# Patient Record
Sex: FEMALE | Race: WHITE | URBAN - METROPOLITAN AREA
[De-identification: names, ages, dates, MRNs, and addresses within clinical notes are randomized per-mention and may not be internally consistent; named-entity substitution may affect disease eponyms.]

---

## 2020-02-20 NOTE — PROGRESS NOTES
HPI:    Patient ID: Eugene Clement is a 42year old female. Anxiety   This is a chronic problem. The current episode started more than 1 month ago. The problem occurs intermittently. The problem has been waxing and waning.  Pertinent negatives include no She is exercising regularly. Recently increased Lexapro to 20 mg daily due to increased anxiety this is been effective. Uses alprazolam 2-3 times per month. Plan to continue current medications. Follow-up here for routine physical and Pap.     Skin lesi

## 2020-03-06 RX ORDER — OSELTAMIVIR PHOSPHATE 75 MG/1
75 CAPSULE ORAL 2 TIMES DAILY
Qty: 10 CAPSULE | Refills: 0 | Status: SHIPPED | OUTPATIENT
Start: 2020-03-06 | End: 2020-03-21

## 2020-03-21 RX ORDER — ALPRAZOLAM 0.25 MG/1
TABLET ORAL
Qty: 60 TABLET | Refills: 1 | Status: SHIPPED | OUTPATIENT
Start: 2020-03-21

## 2020-03-28 RX ORDER — AMOXICILLIN AND CLAVULANATE POTASSIUM 875; 125 MG/1; MG/1
1 TABLET, FILM COATED ORAL 2 TIMES DAILY
Qty: 20 TABLET | Refills: 0 | Status: SHIPPED | OUTPATIENT
Start: 2020-03-28 | End: 2020-04-07

## 2020-07-09 ENCOUNTER — TELEPHONE (OUTPATIENT)
Dept: FAMILY MEDICINE CLINIC | Facility: CLINIC | Age: 40
End: 2020-07-09

## 2020-07-09 RX ORDER — ESTRADIOL 1 MG/1
1 TABLET ORAL DAILY
Qty: 21 TABLET | Refills: 0 | Status: SHIPPED | OUTPATIENT
Start: 2020-07-09 | End: 2020-07-30

## 2020-07-09 NOTE — TELEPHONE ENCOUNTER
Patient has had menstrual.  Heavy at times for the past 2 weeks. She has a Nexplanon but generally does have regular menses. Has been sexually active, checked pregnancy test which was negative, no unusual vaginal discharge or lesions.   Suspect endometria

## 2020-07-21 ENCOUNTER — OFFICE VISIT (OUTPATIENT)
Dept: FAMILY MEDICINE CLINIC | Facility: CLINIC | Age: 40
End: 2020-07-21
Payer: COMMERCIAL

## 2020-07-21 VITALS
DIASTOLIC BLOOD PRESSURE: 64 MMHG | WEIGHT: 152.81 LBS | BODY MASS INDEX: 25.15 KG/M2 | TEMPERATURE: 98 F | HEIGHT: 65.5 IN | RESPIRATION RATE: 18 BRPM | HEART RATE: 76 BPM | SYSTOLIC BLOOD PRESSURE: 102 MMHG

## 2020-07-21 DIAGNOSIS — L98.9 SKIN LESION OF BACK: ICD-10-CM

## 2020-07-21 DIAGNOSIS — N93.8 DUB (DYSFUNCTIONAL UTERINE BLEEDING): ICD-10-CM

## 2020-07-21 DIAGNOSIS — Z01.419 ENCOUNTER FOR GYNECOLOGICAL EXAMINATION WITHOUT ABNORMAL FINDING: Primary | ICD-10-CM

## 2020-07-21 DIAGNOSIS — L30.9 DERMATITIS: ICD-10-CM

## 2020-07-21 DIAGNOSIS — F32.A ANXIETY AND DEPRESSION: ICD-10-CM

## 2020-07-21 DIAGNOSIS — F41.9 ANXIETY AND DEPRESSION: ICD-10-CM

## 2020-07-21 PROBLEM — Z78.9 USES CONTRACEPTIVE IMPLANT FOR BIRTH CONTROL: Status: ACTIVE | Noted: 2020-07-21

## 2020-07-21 PROCEDURE — 90471 IMMUNIZATION ADMIN: CPT | Performed by: FAMILY MEDICINE

## 2020-07-21 PROCEDURE — 3074F SYST BP LT 130 MM HG: CPT | Performed by: FAMILY MEDICINE

## 2020-07-21 PROCEDURE — 90651 9VHPV VACCINE 2/3 DOSE IM: CPT | Performed by: FAMILY MEDICINE

## 2020-07-21 PROCEDURE — 36415 COLL VENOUS BLD VENIPUNCTURE: CPT | Performed by: FAMILY MEDICINE

## 2020-07-21 PROCEDURE — 3008F BODY MASS INDEX DOCD: CPT | Performed by: FAMILY MEDICINE

## 2020-07-21 PROCEDURE — 99395 PREV VISIT EST AGE 18-39: CPT | Performed by: FAMILY MEDICINE

## 2020-07-21 PROCEDURE — 3078F DIAST BP <80 MM HG: CPT | Performed by: FAMILY MEDICINE

## 2020-07-21 NOTE — PROGRESS NOTES
HPI:    Patient ID: Joselito Roth is a 42year old female. HPI    Review of Systems   Constitutional: Negative. Respiratory: Negative. Cardiovascular: Negative. Gastrointestinal: Negative. Genitourinary: Positive for menstrual problem.    Sk of back–left upper back chronic irritation. No suspicious pigmentation. Patient will return for excision. Dermatitis–erythematous papular abdominal rash mildly pruritic occurred 10 days ago after time outdoors.   Suspect chiggers, appears to be [employer]

## 2020-07-22 LAB
ABSOLUTE BASOPHILS: 31 CELLS/UL (ref 0–200)
ABSOLUTE EOSINOPHILS: 120 CELLS/UL (ref 15–500)
ABSOLUTE LYMPHOCYTES: 1976 CELLS/UL (ref 850–3900)
ABSOLUTE MONOCYTES: 478 CELLS/UL (ref 200–950)
ABSOLUTE NEUTROPHILS: 2595 CELLS/UL (ref 1500–7800)
BASOPHILS: 0.6 %
BUN: 13 MG/DL (ref 7–25)
CALCIUM: 9.4 MG/DL (ref 8.6–10.2)
CARBON DIOXIDE: 27 MMOL/L (ref 20–32)
CHLORIDE: 105 MMOL/L (ref 98–110)
CHOL/HDLC RATIO: 3.5 (CALC)
CHOLESTEROL, TOTAL: 219 MG/DL
CREATININE: 0.73 MG/DL (ref 0.5–1.1)
EGFR IF AFRICN AM: 122 ML/MIN/1.73M2
EGFR IF NONAFRICN AM: 105 ML/MIN/1.73M2
EOSINOPHILS: 2.3 %
GLUCOSE: 88 MG/DL (ref 65–99)
HDL CHOLESTEROL: 63 MG/DL
HEMATOCRIT: 39.3 % (ref 35–45)
HEMOGLOBIN: 13.5 G/DL (ref 11.7–15.5)
LDL-CHOLESTEROL: 138 MG/DL (CALC)
LYMPHOCYTES: 38 %
MCH: 30.9 PG (ref 27–33)
MCHC: 34.4 G/DL (ref 32–36)
MCV: 89.9 FL (ref 80–100)
MONOCYTES: 9.2 %
MPV: 10.8 FL (ref 7.5–12.5)
NEUTROPHILS: 49.9 %
NON-HDL CHOLESTEROL: 156 MG/DL (CALC)
PLATELET COUNT: 244 THOUSAND/UL (ref 140–400)
POTASSIUM: 4.5 MMOL/L (ref 3.5–5.3)
RDW: 12.1 % (ref 11–15)
RED BLOOD CELL COUNT: 4.37 MILLION/UL (ref 3.8–5.1)
SODIUM: 139 MMOL/L (ref 135–146)
TRIGLYCERIDES: 81 MG/DL
TSH: 2.07 MIU/L
WHITE BLOOD CELL COUNT: 5.2 THOUSAND/UL (ref 3.8–10.8)

## 2020-07-23 LAB
CHLAMYDIA TRACHOMATIS$RNA, TMA: NOT DETECTED
HPV MRNA E6/E7: NOT DETECTED
NEISSERIA GONORRHOEAE$RNA, TMA: NOT DETECTED

## 2020-07-30 ENCOUNTER — TELEPHONE (OUTPATIENT)
Dept: FAMILY MEDICINE CLINIC | Facility: CLINIC | Age: 40
End: 2020-07-30

## 2020-07-30 RX ORDER — FLUCONAZOLE 150 MG/1
150 TABLET ORAL ONCE
Qty: 1 TABLET | Refills: 0 | Status: SHIPPED | OUTPATIENT
Start: 2020-07-30 | End: 2020-07-30

## 2020-08-20 RX ORDER — ESCITALOPRAM OXALATE 20 MG/1
TABLET ORAL
Qty: 90 TABLET | Refills: 1 | Status: SHIPPED | OUTPATIENT
Start: 2020-08-20 | End: 2020-09-09

## 2020-09-09 NOTE — PROCEDURES
Discussed procedure and possible side effects. Cryo-done today with aseptic technique. lesion treated with liquid nitrogen ×10 seconds, 2 treatments. Tolerated well. Wound care discussed.

## 2020-09-09 NOTE — PROGRESS NOTES
HPI:    Patient ID: J Luis Maldonado is a 42year old female. Anxiety   This is a chronic problem. The current episode started more than 1 month ago. The problem occurs daily. The problem has been gradually worsening.  Associated symptoms include fatigue a estranged 's. Intermittent intrusive thoughts. She has used some marijuana with significant improvement in the symptoms. Discussed pros and cons of medical marijuana certification, certification done today.     Skin lesion of back– left scapular r

## 2020-10-05 RX ORDER — DESVENLAFAXINE 50 MG/1
TABLET, EXTENDED RELEASE ORAL
Qty: 30 TABLET | Refills: 1 | Status: SHIPPED | OUTPATIENT
Start: 2020-10-05 | End: 2020-10-30

## 2020-10-05 NOTE — TELEPHONE ENCOUNTER
Please let patient know refill done but recommend follow-up appointment– virtual or office in 2 to 3 weeks

## 2020-10-30 RX ORDER — DESVENLAFAXINE 50 MG/1
TABLET, EXTENDED RELEASE ORAL
Qty: 30 TABLET | Refills: 1 | Status: SHIPPED | OUTPATIENT
Start: 2020-10-30 | End: 2020-12-15

## 2020-11-03 ENCOUNTER — APPOINTMENT (OUTPATIENT)
Dept: LAB | Age: 40
End: 2020-11-03
Attending: FAMILY MEDICINE
Payer: COMMERCIAL

## 2020-11-03 ENCOUNTER — TELEPHONE (OUTPATIENT)
Dept: FAMILY MEDICINE CLINIC | Facility: CLINIC | Age: 40
End: 2020-11-03

## 2020-11-03 DIAGNOSIS — Z20.822 EXPOSURE TO COVID-19 VIRUS: ICD-10-CM

## 2020-11-03 DIAGNOSIS — R51.9 ACUTE NONINTRACTABLE HEADACHE, UNSPECIFIED HEADACHE TYPE: ICD-10-CM

## 2020-11-03 DIAGNOSIS — R51.9 ACUTE NONINTRACTABLE HEADACHE, UNSPECIFIED HEADACHE TYPE: Primary | ICD-10-CM

## 2020-11-03 NOTE — TELEPHONE ENCOUNTER
Order signed.   Please also document whether patient was within 6 feet for a total of more than 15 minutes (as this determines whether she should quarantine for 14 days in spite of results)

## 2020-11-03 NOTE — TELEPHONE ENCOUNTER
Spoke with pt. She was in contact with positive person within 6 feet. Advised her of 14 day quarantine.  Pt verbalized understanding

## 2020-11-03 NOTE — TELEPHONE ENCOUNTER
Pt exposed to [employer] at work. Has complaints of headache and fatigue.  Requesting Covid test, Order pended

## 2020-12-15 ENCOUNTER — TELEPHONE (OUTPATIENT)
Dept: FAMILY MEDICINE CLINIC | Facility: CLINIC | Age: 40
End: 2020-12-15

## 2020-12-15 RX ORDER — DESVENLAFAXINE 50 MG/1
50 TABLET, EXTENDED RELEASE ORAL DAILY
Qty: 90 TABLET | Refills: 1 | Status: SHIPPED | OUTPATIENT
Start: 2020-12-15 | End: 2020-12-16

## 2020-12-15 NOTE — TELEPHONE ENCOUNTER
Karmen Dennison needs a refill of:             • DESVENLAFAXINE SUCCINATE ER 50 MG Oral Tablet 24 Hr TAKE 1 TABLET BY MOUTH EVERY DAY 30 tablet 1            [facility] is requesting a 90 day supply

## 2020-12-16 RX ORDER — ESCITALOPRAM OXALATE 10 MG/1
20 TABLET ORAL DAILY
Qty: 90 TABLET | Refills: 0 | Status: SHIPPED | OUTPATIENT
Start: 2020-12-16 | End: 2020-12-30

## 2020-12-30 RX ORDER — ESCITALOPRAM OXALATE 10 MG/1
20 TABLET ORAL DAILY
Qty: 90 TABLET | Refills: 0 | Status: SHIPPED | OUTPATIENT
Start: 2020-12-30 | End: 2021-05-10

## 2020-12-30 NOTE — TELEPHONE ENCOUNTER
90 day     Current Outpatient Medications:   •  escitalopram 10 MG Oral Tab, Take 2 tablets (20 mg total) by mouth daily. , Disp: 90 tablet, Rfl: 0

## 2021-03-16 NOTE — PROGRESS NOTES
HPI/Subjective:   Patient ID: Bekah Arango is a 47year old female. Bekah Arango verbally consents to a Virtual/Telephone Check-In service on 03/16/21.     Duration of Service:  7 minutes    Patient has been referred to the [facility] website at [address] [facility]

## 2021-04-19 ENCOUNTER — TELEPHONE (OUTPATIENT)
Dept: INTERNAL MEDICINE CLINIC | Facility: CLINIC | Age: 41
End: 2021-04-19

## 2021-04-19 RX ORDER — ONDANSETRON HYDROCHLORIDE 8 MG/1
8 TABLET, FILM COATED ORAL EVERY 8 HOURS PRN
Qty: 15 TABLET | Refills: 0 | Status: SHIPPED | OUTPATIENT
Start: 2021-04-19

## 2021-05-10 RX ORDER — ESCITALOPRAM OXALATE 10 MG/1
20 TABLET ORAL DAILY
Qty: 90 TABLET | Refills: 0 | Status: SHIPPED | OUTPATIENT
Start: 2021-05-10 | End: 2021-06-17

## 2021-05-15 ENCOUNTER — NURSE ONLY (OUTPATIENT)
Dept: FAMILY MEDICINE CLINIC | Facility: CLINIC | Age: 41
End: 2021-05-15
Payer: COMMERCIAL

## 2021-05-15 DIAGNOSIS — Z23 NEED FOR VACCINATION: Primary | ICD-10-CM

## 2021-05-15 PROCEDURE — 90651 9VHPV VACCINE 2/3 DOSE IM: CPT | Performed by: FAMILY MEDICINE

## 2021-05-15 PROCEDURE — 90471 IMMUNIZATION ADMIN: CPT | Performed by: FAMILY MEDICINE

## 2021-06-17 RX ORDER — ESCITALOPRAM OXALATE 10 MG/1
TABLET ORAL
Qty: 90 TABLET | Refills: 0 | Status: SHIPPED | OUTPATIENT
Start: 2021-06-17 | End: 2021-07-02

## 2021-07-02 RX ORDER — ESCITALOPRAM OXALATE 10 MG/1
20 TABLET ORAL DAILY
Qty: 90 TABLET | Refills: 0 | Status: SHIPPED | OUTPATIENT
Start: 2021-07-02 | End: 2021-08-02

## 2021-07-06 ENCOUNTER — TELEPHONE (OUTPATIENT)
Dept: FAMILY MEDICINE CLINIC | Facility: CLINIC | Age: 41
End: 2021-07-06

## 2021-07-06 NOTE — TELEPHONE ENCOUNTER
Patient was on Escitalopram 10mg 2 tabs daily, see TE 5/10/21 patient indicated is going to start on 10mg 1 tab daily[facility]

## 2021-08-02 NOTE — PROGRESS NOTES
HPI/Subjective:   Patient ID: Sukhdeep Michael is a 47year old female. Patient presents to follow-up on Lexapro as below. Also perianal problems as below.       History/Other:   Review of Systems  Current Outpatient Medications   Medication Sig Dispense defined types were placed in this encounter. Meds This Visit:  Requested Prescriptions     Signed Prescriptions Disp Refills   • escitalopram 10 MG Oral Tab 180 tablet 1     Sig: Take 2 tablets (20 mg total) by mouth daily.        Imaging & Referrals:

## 2021-11-01 ENCOUNTER — IMMUNIZATION (OUTPATIENT)
Dept: FAMILY MEDICINE CLINIC | Facility: CLINIC | Age: 41
End: 2021-11-01
Payer: COMMERCIAL

## 2021-11-01 DIAGNOSIS — Z23 NEED FOR VACCINATION: Primary | ICD-10-CM

## 2021-11-01 PROCEDURE — 90686 IIV4 VACC NO PRSV 0.5 ML IM: CPT | Performed by: FAMILY MEDICINE

## 2021-11-01 PROCEDURE — 90471 IMMUNIZATION ADMIN: CPT | Performed by: FAMILY MEDICINE

## 2022-01-21 ENCOUNTER — LAB ENCOUNTER (OUTPATIENT)
Dept: LAB | Age: 42
End: 2022-01-21
Attending: FAMILY MEDICINE
Payer: COMMERCIAL

## 2022-01-21 DIAGNOSIS — E78.00 HYPERCHOLESTEROLEMIA: Primary | ICD-10-CM

## 2022-01-22 LAB
CHOL/HDLC RATIO: 3.2 (CALC)
CHOLESTEROL, TOTAL: 229 MG/DL
HDL CHOLESTEROL: 71 MG/DL
LDL-CHOLESTEROL: 144 MG/DL (CALC)
NON-HDL CHOLESTEROL: 158 MG/DL (CALC)
TRIGLYCERIDES: 58 MG/DL

## 2022-05-05 RX ORDER — ESCITALOPRAM OXALATE 10 MG/1
TABLET ORAL
Qty: 180 TABLET | Refills: 1 | Status: SHIPPED | OUTPATIENT
Start: 2022-05-05

## 2022-06-17 ENCOUNTER — OFFICE VISIT (OUTPATIENT)
Dept: FAMILY MEDICINE CLINIC | Facility: CLINIC | Age: 42
End: 2022-06-17
Payer: COMMERCIAL

## 2022-06-17 VITALS
OXYGEN SATURATION: 98 % | HEART RATE: 92 BPM | HEIGHT: 65 IN | WEIGHT: 157 LBS | DIASTOLIC BLOOD PRESSURE: 62 MMHG | BODY MASS INDEX: 26.16 KG/M2 | SYSTOLIC BLOOD PRESSURE: 110 MMHG | TEMPERATURE: 98 F

## 2022-06-17 DIAGNOSIS — Z30.46 ENCOUNTER FOR REMOVAL AND REINSERTION OF NEXPLANON: Primary | ICD-10-CM

## 2022-06-17 LAB
CONTROL LINE PRESENT WITH A CLEAR BACKGROUND (YES/NO): YES YES/NO
PREGNANCY TEST, URINE: NEGATIVE

## 2022-11-01 RX ORDER — ESCITALOPRAM OXALATE 10 MG/1
20 TABLET ORAL DAILY
Qty: 180 TABLET | Refills: 1 | Status: SHIPPED | OUTPATIENT
Start: 2022-11-01

## 2022-11-01 NOTE — TELEPHONE ENCOUNTER
[facility]     Requested Prescriptions   Pending Prescriptions Disp Refills    ESCITALOPRAM 10 MG Oral Tab [Pharmacy Med Name: ESCITALOPRAM 10 MG TABLET] 180 tablet 1     Sig: TAKE 2 TABLETS BY MOUTH EVERY DAY       Psychiatric Non-Scheduled (Anti-Anxiety) Passed - 11/1/2022 12:17 AM        Passed - In person appointment or virtual visit in the past 6 mos or appointment in next 3 mos     Recent Outpatient Visits              4 months ago Encounter for removal and reinsertion of [address], [address], Familia Suárez MD    Office Visit    1 year ago Anxiety and depression    [facility], [employer] [facility], Mike Gonzales MD    Office Visit    1 year ago Need for vaccination    [facility], [employer] [facility]    Nurse Only    1 year ago Anxiety and depression    [facility], [employer], Mike Gonzales MD    [employer] E[facility]    2 years ago Anxiety and depression    [facility] EldarionLay Margreta Sloop, MD    Office Visit                           Recent Outpatient Visits              4 months ago Encounter for removal and reinsertion of [address], [address], Familia Suárez MD    Office Visit    1 year ago Anxiety and depression    [facility] Lay Margreta Sloop, MD    Office Visit    1 year ago Need for vaccination    [facility], [employer] [facility]    Nurse Only    1 year ago Anxiety and depression    [facility], [employer] [address] [place] [address] MD Debbie    [employer] E[facility]    2 years ago Anxiety and depression    [facility], [employer] [facility] Kendell Torres MD    Office Visit

## 2022-11-21 ENCOUNTER — NURSE TRIAGE (OUTPATIENT)
Dept: FAMILY MEDICINE CLINIC | Facility: CLINIC | Age: 42
End: 2022-11-21

## 2022-11-21 RX ORDER — NORETHINDRONE ACETATE AND ETHINYL ESTRADIOL 1; .02 MG/1; MG/1
1 TABLET ORAL DAILY
Qty: 84 TABLET | Refills: 0 | Status: SHIPPED | OUTPATIENT
Start: 2022-11-21

## 2022-11-21 NOTE — TELEPHONE ENCOUNTER
Patient called back (name and  verified) and instructed on providers message below. Patient verbalized understanding and agrees to plan.     Future Appointments   Date Time Provider Alexei Mcmullen   2022  4:15 PM [facility]

## 2022-11-21 NOTE — TELEPHONE ENCOUNTER
Patient has probably had progesterone in the past however given the fact that these are likely side effects of the Nexplanon, combined birth control pill will help with irregular bleeding secondary to Nexplanon. Prescription to pharmacy.

## 2022-11-28 ENCOUNTER — OFFICE VISIT (OUTPATIENT)
Dept: FAMILY MEDICINE CLINIC | Facility: CLINIC | Age: 42
End: 2022-11-28
Payer: COMMERCIAL

## 2022-11-28 VITALS
DIASTOLIC BLOOD PRESSURE: 70 MMHG | RESPIRATION RATE: 19 BRPM | HEIGHT: 65 IN | WEIGHT: 164 LBS | HEART RATE: 70 BPM | BODY MASS INDEX: 27.32 KG/M2 | OXYGEN SATURATION: 98 % | SYSTOLIC BLOOD PRESSURE: 121 MMHG

## 2022-11-28 DIAGNOSIS — Z30.46 ENCOUNTER FOR NEXPLANON REMOVAL: Primary | ICD-10-CM

## 2022-11-28 PROCEDURE — 3074F SYST BP LT 130 MM HG: CPT | Performed by: FAMILY MEDICINE

## 2022-11-28 PROCEDURE — 3008F BODY MASS INDEX DOCD: CPT | Performed by: FAMILY MEDICINE

## 2022-11-28 PROCEDURE — 99213 OFFICE O/P EST LOW 20 MIN: CPT | Performed by: FAMILY MEDICINE

## 2022-11-28 PROCEDURE — 3078F DIAST BP <80 MM HG: CPT | Performed by: FAMILY MEDICINE

## 2022-12-06 ENCOUNTER — OFFICE VISIT (OUTPATIENT)
Dept: SURGERY | Facility: CLINIC | Age: 42
End: 2022-12-06
Payer: COMMERCIAL

## 2022-12-06 VITALS — BODY MASS INDEX: 25.71 KG/M2 | HEIGHT: 66 IN | WEIGHT: 160 LBS

## 2022-12-06 DIAGNOSIS — M79.5 FOREIGN BODY (FB) IN SOFT TISSUE: Primary | ICD-10-CM

## 2022-12-06 PROCEDURE — 99203 OFFICE O/P NEW LOW 30 MIN: CPT | Performed by: SURGERY

## 2022-12-06 PROCEDURE — 3008F BODY MASS INDEX DOCD: CPT | Performed by: SURGERY

## 2022-12-06 NOTE — PATIENT INSTRUCTIONS
Obtain preoperative labs. Same-day surgery will call the day before with instructions. Avoid aspirin and NSAIDs for 5 days prior to surgery.     May take your other medicines with a sip and of water the morning of surgery

## 2022-12-07 ENCOUNTER — OFFICE VISIT (OUTPATIENT)
Dept: SURGERY | Facility: CLINIC | Age: 42
End: 2022-12-07
Payer: COMMERCIAL

## 2022-12-07 VITALS — SYSTOLIC BLOOD PRESSURE: 92 MMHG | HEART RATE: 74 BPM | DIASTOLIC BLOOD PRESSURE: 55 MMHG

## 2022-12-07 DIAGNOSIS — M79.5 FOREIGN BODY (FB) IN SOFT TISSUE: Primary | ICD-10-CM

## 2022-12-07 PROCEDURE — 3074F SYST BP LT 130 MM HG: CPT | Performed by: SURGERY

## 2022-12-07 PROCEDURE — 3078F DIAST BP <80 MM HG: CPT | Performed by: SURGERY

## 2022-12-07 NOTE — PROCEDURES
Surgery procedure note    Timeout performed and consent signed. Left arm prepped and draped in the usual sterile fashion. Vertical incision is made 3 cm proximal to the old incision. Careful dissection to the subcutaneous tissue reveals the foreign body. Completely removed intact hemostasis maintained with compression incision closed with interrupted 4-0 Monocryl in a subcuticular fashion. Benzoin and Steri-Strips were placed.   Tolerated the procedure well

## 2022-12-07 NOTE — PATIENT INSTRUCTIONS
Ice pack if needed take Tylenol or ibuprofen for any pain    Avoid vigorous exercise or heavy lifting with the left arm for 1 week. Okay to shower tomorrow, leave white Steri-Strips in place for 1 week.

## 2023-01-03 ENCOUNTER — TELEPHONE (OUTPATIENT)
Dept: FAMILY MEDICINE CLINIC | Facility: CLINIC | Age: 43
End: 2023-01-03

## 2023-01-03 DIAGNOSIS — Z12.31 ENCOUNTER FOR SCREENING MAMMOGRAM FOR BREAST CANCER: Primary | ICD-10-CM

## 2023-01-06 RX ORDER — ALPRAZOLAM 0.25 MG/1
TABLET ORAL
Qty: 60 TABLET | Refills: 1 | Status: SHIPPED | OUTPATIENT
Start: 2023-01-06

## 2023-01-06 NOTE — TELEPHONE ENCOUNTER
Please review. Protocol failed / No Protocol.     Requested Prescriptions   Pending Prescriptions Disp Refills    ALPRAZolam 0.25 MG Oral Tab 60 tablet 1     Sig: To take BID PRN anxiety       There is no refill protocol information for this order

## 2023-01-19 ENCOUNTER — HOSPITAL ENCOUNTER (OUTPATIENT)
Dept: MAMMOGRAPHY | Age: 43
Discharge: HOME | End: 2023-01-19
Attending: FAMILY MEDICINE
Payer: COMMERCIAL

## 2023-01-19 DIAGNOSIS — Z12.31 ENCOUNTER FOR SCREENING MAMMOGRAM FOR BREAST CANCER: ICD-10-CM

## 2023-01-19 PROCEDURE — 77067 SCR MAMMO BI INCL CAD: CPT | Performed by: FAMILY MEDICINE

## 2023-01-19 PROCEDURE — 77063 BREAST TOMOSYNTHESIS BI: CPT | Performed by: FAMILY MEDICINE

## 2023-01-26 ENCOUNTER — HOSPITAL ENCOUNTER (OUTPATIENT)
Dept: MAMMOGRAPHY | Facility: HOSPITAL | Age: 43
Discharge: HOME | End: 2023-01-26
Attending: FAMILY MEDICINE
Payer: COMMERCIAL

## 2023-01-26 ENCOUNTER — HOSPITAL ENCOUNTER (OUTPATIENT)
Dept: ULTRASOUND IMAGING | Facility: HOSPITAL | Age: 43
Discharge: HOME | End: 2023-01-26
Attending: FAMILY MEDICINE
Payer: COMMERCIAL

## 2023-01-26 DIAGNOSIS — R92.8 ABNORMAL MAMMOGRAM: Primary | ICD-10-CM

## 2023-01-26 DIAGNOSIS — R92.8 ABNORMAL MAMMOGRAM: ICD-10-CM

## 2023-01-26 PROCEDURE — 77065 DX MAMMO INCL CAD UNI: CPT | Performed by: FAMILY MEDICINE

## 2023-01-26 PROCEDURE — 77061 BREAST TOMOSYNTHESIS UNI: CPT | Performed by: FAMILY MEDICINE

## 2023-01-26 PROCEDURE — 76642 ULTRASOUND BREAST LIMITED: CPT | Performed by: FAMILY MEDICINE

## 2023-02-20 RX ORDER — NORETHINDRONE ACETATE AND ETHINYL ESTRADIOL 1; 20 MG/1; UG/1
TABLET ORAL
Qty: 84 TABLET | Refills: 0 | OUTPATIENT
Start: 2023-02-20

## 2023-02-20 NOTE — TELEPHONE ENCOUNTER
Spoke with Jenifer Oconnell at [facility] pharmacist    Stated Lorenza Innocent and Loestrin are the same medication    Advised her patient report not taking on 11/28/22    She will notify patient and have patient call back if needed.

## 2023-03-14 RX ORDER — NORETHINDRONE ACETATE AND ETHINYL ESTRADIOL 1; .02 MG/1; MG/1
1 TABLET ORAL DAILY
Qty: 84 TABLET | Refills: 0 | Status: SHIPPED | OUTPATIENT
Start: 2023-03-14

## 2023-03-14 RX ORDER — ESCITALOPRAM OXALATE 10 MG/1
10 TABLET ORAL DAILY
Qty: 90 TABLET | Refills: 3 | Status: SHIPPED | OUTPATIENT
Start: 2023-03-14

## 2023-03-14 NOTE — TELEPHONE ENCOUNTER
Refill passed per [facility] protocol. Requested Prescriptions   Pending Prescriptions Disp Refills    Norethindrone Acet-Ethinyl Est (LOESTRIN 1/20, 21,) 1-20 MG-MCG Oral Tab 84 tablet 0     Sig: Take 1 tablet by mouth daily.        Gynecology Medication Protocol Failed - 3/13/2023  6:28 PM        Failed - Pass dependent on manual look-up of last PAP and patient compliance with PAP follow up recommendations        Passed - In person appointment or virtual visit in the past 12 mos or appointment in next 3 mos     Recent Outpatient Visits              3 months ago Foreign body (FB) in soft tissue    George Cohen, Irene Issa MD    Office Visit    3 months ago Foreign body (FB) in soft tissue    Irene Danielson MD    Office Visit    3 months ago Encounter for [employer] removal    George Cohen, Javier Santos MD    Office Visit    9 months ago Encounter for removal and reinsertion of [employer] Corporation[employer], Javier Santos MD    Office Visit    1 year ago Anxiety and depression    George Cohen, [facility] Yusra Givens MD    Office Visit

## 2023-03-14 NOTE — TELEPHONE ENCOUNTER
Refill passed per [email or web address] protocol  Please see patient message below - doing well on 10 mg daily. Medication pended for your review and approval.     Requested Prescriptions   Pending Prescriptions Disp Refills    escitalopram 10 MG Oral Tab 180 tablet 1     Sig: Take 2 tablets (20 mg total) by mouth daily.        Psychiatric Non-Scheduled (Anti-Anxiety) Passed - 3/13/2023  6:28 PM        Passed - In person appointment or virtual visit in the past 6 mos or appointment in next 3 mos     Recent Outpatient Visits              3 months ago Foreign body (FB) in soft tissue    George Arechiga, Crys Roach MD    Office Visit    3 months ago Foreign body (FB) in soft tissue    [place] Asif MD    Office Visit    3 months ago Encounter for [employer] removal    George Arechiga, Ada Iglesias MD    Office Visit    9 months ago Encounter for removal and reinsertion of [employer], George Moreno, Ada Iglesias MD    Office Visit    1 year ago Anxiety and depression    George Arechiga, Jazzy Alcaraz MD    Office Visit

## 2023-07-28 ENCOUNTER — HOSPITAL ENCOUNTER (OUTPATIENT)
Dept: MAMMOGRAPHY | Facility: HOSPITAL | Age: 43
Discharge: HOME | End: 2023-07-28
Attending: FAMILY MEDICINE
Payer: COMMERCIAL

## 2023-07-28 ENCOUNTER — HOSPITAL ENCOUNTER (OUTPATIENT)
Dept: ULTRASOUND IMAGING | Facility: HOSPITAL | Age: 43
Discharge: HOME | End: 2023-07-28
Attending: FAMILY MEDICINE
Payer: COMMERCIAL

## 2023-07-28 DIAGNOSIS — R92.8 ABNORMAL MAMMOGRAM: ICD-10-CM

## 2023-07-28 PROCEDURE — 77065 DX MAMMO INCL CAD UNI: CPT | Performed by: FAMILY MEDICINE

## 2023-07-28 PROCEDURE — 76642 ULTRASOUND BREAST LIMITED: CPT | Performed by: FAMILY MEDICINE

## 2023-07-28 PROCEDURE — 77061 BREAST TOMOSYNTHESIS UNI: CPT | Performed by: FAMILY MEDICINE

## 2024-02-08 ENCOUNTER — APPOINTMENT (OUTPATIENT)
Dept: URGENT CARE | Age: 44
End: 2024-02-08

## 2024-03-21 ENCOUNTER — PATIENT MESSAGE (OUTPATIENT)
Dept: FAMILY MEDICINE CLINIC | Facility: CLINIC | Age: 44
End: 2024-03-21

## 2024-03-22 NOTE — TELEPHONE ENCOUNTER
Even with the recertifications, I have to enter in person appointment date.  Please schedule patient.  Okay to use res24 if needed.

## 2024-07-02 ENCOUNTER — OFFICE VISIT (OUTPATIENT)
Dept: FAMILY MEDICINE CLINIC | Facility: CLINIC | Age: 44
End: 2024-07-02
Payer: COMMERCIAL

## 2024-07-02 VITALS
OXYGEN SATURATION: 98 % | TEMPERATURE: 98 F | WEIGHT: 165 LBS | DIASTOLIC BLOOD PRESSURE: 80 MMHG | HEART RATE: 81 BPM | HEIGHT: 65.35 IN | BODY MASS INDEX: 27.16 KG/M2 | SYSTOLIC BLOOD PRESSURE: 114 MMHG

## 2024-07-02 DIAGNOSIS — K13.0 LIP LESION: ICD-10-CM

## 2024-07-02 DIAGNOSIS — R41.840 CONCENTRATION DEFICIT: ICD-10-CM

## 2024-07-02 DIAGNOSIS — Z12.31 VISIT FOR SCREENING MAMMOGRAM: ICD-10-CM

## 2024-07-02 DIAGNOSIS — Z00.00 ROUTINE PHYSICAL EXAMINATION: Primary | ICD-10-CM

## 2024-07-02 PROBLEM — Z78.9 USES CONTRACEPTIVE IMPLANT FOR BIRTH CONTROL: Status: RESOLVED | Noted: 2020-07-21 | Resolved: 2024-07-02

## 2024-07-02 PROCEDURE — 99396 PREV VISIT EST AGE 40-64: CPT | Performed by: FAMILY MEDICINE

## 2024-07-02 RX ORDER — CHOLECALCIFEROL (VITAMIN D3) 50 MCG
2000 TABLET ORAL DAILY
Qty: 90 TABLET | Refills: 3 | Status: SHIPPED | OUTPATIENT
Start: 2024-07-02 | End: 2024-08-01

## 2024-07-02 NOTE — PROGRESS NOTES
Subjective:   Patient ID: Sujatha Olivarez is a 43 year old female.    Patient presents for routine physical.        History/Other:   Review of Systems   Constitutional: Negative.    Respiratory: Negative.     Cardiovascular: Negative.    Gastrointestinal: Negative.    Skin: Negative.    Neurological: Negative.    Psychiatric/Behavioral:  Positive for decreased concentration.      Current Outpatient Medications   Medication Sig Dispense Refill    Cholecalciferol (VITAMIN D) 50 MCG (2000 UT) Oral Tab Take 2,000 Int'l Units by mouth daily. 90 tablet 3     Allergies:No Known Allergies    Objective:   Physical Exam  Constitutional:       Appearance: Normal appearance.   HENT:      Mouth/Throat:      Comments: See below  Cardiovascular:      Rate and Rhythm: Normal rate and regular rhythm.      Heart sounds: Normal heart sounds.   Pulmonary:      Effort: Pulmonary effort is normal.      Breath sounds: Normal breath sounds.   Chest:   Breasts:     Right: Normal. No mass.      Left: Normal. No mass.   Abdominal:      Palpations: Abdomen is soft. There is no mass.      Tenderness: There is no abdominal tenderness.   Lymphadenopathy:      Cervical: No cervical adenopathy.      Upper Body:      Right upper body: No axillary adenopathy.      Left upper body: No axillary adenopathy.   Skin:     General: Skin is warm and dry.   Neurological:      Mental Status: She is alert and oriented to person, place, and time.         Assessment & Plan:   1. Routine physical examination-patient is single, children 10 and 7, sexually active with 1 partner.  Using natural rhythm birth control cristian, we discussed risk of AMA pregnancy, but she reports she would be okay if pregnancy occurred.  Exercising with yoga, walking.  Has been working with chiropractor/naturopathic Dr. Chinchilla, [facility].  She notes her insomnia and regularity of menstrual cycles have improved with prebiotic's, probiotics and other recommendations.  Anxiety and depression  also better overall.  Not taking Lexapro any longer.  Although concerns with concentration as below.  Labs done today   2. Visit for screening mammogram-order given   3. Concentration deficit-patient notes she sometimes has difficulty completing projects, also excessive caffeine intake.  She is concerned this may be signs of underlying ADD.  No childhood symptoms, did well in school.  She does have history of anxiety and PTSD.  Recommend neuropsychological testing or working with psychologist to better understand cause of symptoms.   4. Lip lesion-lateral left lower lip with 3 mm raised pink lesion which blanches.  Suspect venous lake.  Monitor for any change, dermatology if concerns.       Orders Placed This Encounter   Procedures    Basic Metabolic Panel (8) [E]    Lipid Panel [E]    CBC, Platelet; No Differential       Meds This Visit:  Requested Prescriptions     Signed Prescriptions Disp Refills    Cholecalciferol (VITAMIN D) 50 MCG (2000 UT) Oral Tab 90 tablet 3     Sig: Take 2,000 Int'l Units by mouth daily.       Imaging & Referrals:  OP REFERRAL TO [facility]  STEPHON TRIPP 2D+3D SCREENING BILAT (CPT=77067/06769)

## 2024-07-03 ENCOUNTER — HOSPITAL ENCOUNTER (OUTPATIENT)
Dept: MAMMOGRAPHY | Age: 44
Discharge: HOME | End: 2024-07-03
Attending: FAMILY MEDICINE
Payer: COMMERCIAL

## 2024-07-03 DIAGNOSIS — Z12.31 VISIT FOR SCREENING MAMMOGRAM: ICD-10-CM

## 2024-07-03 PROCEDURE — 77067 SCR MAMMO BI INCL CAD: CPT | Performed by: FAMILY MEDICINE

## 2024-07-03 PROCEDURE — 77063 BREAST TOMOSYNTHESIS BI: CPT | Performed by: FAMILY MEDICINE

## 2024-07-06 DIAGNOSIS — R92.8 ABNORMAL MAMMOGRAM OF RIGHT BREAST: Primary | ICD-10-CM

## 2024-07-10 ENCOUNTER — TELEPHONE (OUTPATIENT)
Age: 44
End: 2024-07-10

## 2024-07-10 NOTE — TELEPHONE ENCOUNTER
Zurdo Solares,    I am glad that we were able to connect today. Here are some therapy resources that may be a good fit for you. Please verify your insurance coverage with any providers that you may choose to call and schedule with directly. If distance is a concern, please inquire about virtual options. If there is anything else I can assist with, then please respond directly to this email, or give me a call at [phone]. If you need more immediate assistance, or assistance outside of business hours, please contact the [facility] 24/7 helpline at [phone].    [facility]  [address]  [address]  [place] [address]  Phone: [phone]    [facility]  [address] [place] [address]  Phone: [phone]    [facility]  [address]  [address]  [place], [place] [address]  Phone: [phone]    [facility]  [address]  [address]  [place] [address]   Phone: [phone]      Sandra Hart (she/her/hers)  Patient Care Navigator Mental Health   [facility]  [phone] or 24/7 help line: [phone]  [email or web address]  Request an assessment or support »

## 2024-07-12 ENCOUNTER — HOSPITAL ENCOUNTER (OUTPATIENT)
Dept: MAMMOGRAPHY | Facility: HOSPITAL | Age: 44
Discharge: HOME | End: 2024-07-12
Attending: FAMILY MEDICINE
Payer: COMMERCIAL

## 2024-07-12 DIAGNOSIS — R92.8 ABNORMAL MAMMOGRAM OF RIGHT BREAST: ICD-10-CM

## 2024-07-12 PROCEDURE — 77061 BREAST TOMOSYNTHESIS UNI: CPT | Performed by: FAMILY MEDICINE

## 2024-07-12 PROCEDURE — 77065 DX MAMMO INCL CAD UNI: CPT | Performed by: FAMILY MEDICINE

## 2024-08-14 ENCOUNTER — NURSE TRIAGE (OUTPATIENT)
Dept: FAMILY MEDICINE CLINIC | Facility: CLINIC | Age: 44
End: 2024-08-14

## 2024-08-14 NOTE — TELEPHONE ENCOUNTER
I am not here.  SK says she can see her Friday, discuss timing with Sanna and confirm this is okay with patient.

## 2024-08-14 NOTE — TELEPHONE ENCOUNTER
Please reply to pool: EM RN TRIAGE  Action Requested: Summary for Provider     []  Critical Lab, Recommendations Needed  [x] Need Additional Advice  []   FYI    []   Need Orders  [] Need Medications Sent to Pharmacy  []  Other     SUMMARY: Patient contacts clinic reporting pain and numbness that began in her wrist and fingers of right arm, now has progressed to numbness, tingling and soreness of right arm traveling up.  History of carpal tunnel syndrome during pregnancy and initially felt similar.  Now involving her whole arm.  Denies weakness or affected ROM.  Denies neck or shoulder pain.  Denies chest pain or shortness of breath.  Denies swelling.  No acute visits to offer patient. Inquires if she can be worked in this week.  Please advise.  Immediate care/emergency room flags discussed, patient verbalized understanding.      Reason for call: Arm Pain  Onset: Data Unavailable                       Reason for Disposition   Numbness (i.e., loss of sensation) in hand or fingers    Protocols used: Arm Pain-A-OH

## 2024-08-14 NOTE — TELEPHONE ENCOUNTER
Called patient, confirmed name and .    Patient scheduled for Friday at 1pm.      Future Appointments   Date Time Provider Department Center   2024  1:00 PM Neo Zamora MD [facility]

## 2024-08-16 ENCOUNTER — OFFICE VISIT (OUTPATIENT)
Dept: FAMILY MEDICINE CLINIC | Facility: CLINIC | Age: 44
End: 2024-08-16
Payer: COMMERCIAL

## 2024-08-16 VITALS
HEIGHT: 65.35 IN | DIASTOLIC BLOOD PRESSURE: 76 MMHG | OXYGEN SATURATION: 98 % | BODY MASS INDEX: 26.67 KG/M2 | RESPIRATION RATE: 17 BRPM | WEIGHT: 162 LBS | SYSTOLIC BLOOD PRESSURE: 110 MMHG | HEART RATE: 73 BPM

## 2024-08-16 DIAGNOSIS — G56.01 CARPAL TUNNEL SYNDROME OF RIGHT WRIST: Primary | ICD-10-CM

## 2024-08-16 PROCEDURE — 99213 OFFICE O/P EST LOW 20 MIN: CPT | Performed by: FAMILY MEDICINE

## 2024-08-16 NOTE — PROGRESS NOTES
HPI:    Sujatha Olivarez is a 43 year old female.  2 weeks back, started feeling a numbness/tingling in 3 fingers and forearm.  No visible swelling/pain.  Feels her hand is weak when she opens jars, turns doorknobs, etc.  Patient is also noticed that in the middle of the night, her hand feels numb and it wakes her from her sleep.  She types a lot for work.  History of carpal tunnel during pregnancy.       HISTORY:  Past Medical History:    Uses contraceptive implant for birth control      No past surgical history on file.   Family History   Problem Relation Age of Onset    Breast Cancer Other         mat grt aunt      Social History:   Social History     Socioeconomic History    Marital status:    Tobacco Use    Smoking status: Never    Smokeless tobacco: Never   Vaping Use    Vaping status: Never Used   Substance and Sexual Activity    Alcohol use: Yes    Drug use: Not Currently    Sexual activity: Yes        Medications (Active prior to today's visit):  No current outpatient medications on file.       Allergies:  No Known Allergies      Depression Screening (PHQ-2/PHQ-9): Over the LAST 2 WEEKS                         ROS:   Review of Systems   All other systems reviewed and are negative.      PHYSICAL EXAM:     Vitals:    08/16/24 1303   BP: 110/76   BP Location: Left arm   Patient Position: Sitting   Cuff Size: adult   Pulse: 73   Resp: 17   SpO2: 98%   Weight: 162 lb (73.5 kg)   Height: 5' 5.35\" (1.66 m)     Physical Exam  Constitutional:       General: She is not in acute distress.  Cardiovascular:      Rate and Rhythm: Normal rate.   Pulmonary:      Effort: Pulmonary effort is normal. No respiratory distress.   Musculoskeletal:      Comments: Right Upper Extremity - negative Phalen's/Tinel's sign.    Neurological:      Mental Status: She is alert.   Psychiatric:         Mood and Affect: Mood normal.         ASSESSMENT/PLAN:   (G56.01) Carpal tunnel syndrome of right wrist  (primary encounter  diagnosis)  Plan:   - Symptoms sound consistent with carpal tunnel. Splint given, advised nightly use for 4-6 weeks. If symptoms don't improve, we can consider EMG/Physiatry referral.            Responsible party/patient verbalized understanding of information discussed. No barriers to learning observed.            Orders This Visit:  No orders of the defined types were placed in this encounter.      Meds This Visit:  Requested Prescriptions      No prescriptions requested or ordered in this encounter       Imaging & Referrals:  None     Chaperone offered at visit today.     The 21st Century cures Act makes medical notes like these available to patients in the interest of transparency.  However, be advised that this is a medical document.  It is intended as peer to peer communication.  It is written in medical language and may contain abbreviations or verbiage that are unfamiliar.  It may appear blunt or direct.  Medical documents are intended to carry relevant information, facts as evident, and the clinical opinion of the practitioner.      This note was created by [employer] voice recognition. Errors in content may be related to improper recognition by the system; efforts to review and correct have been done but errors may still exist. Please contact me with any questions.       8/16/2024  Neo Zamora MD

## 2025-02-04 ENCOUNTER — TELEPHONE (OUTPATIENT)
Dept: FAMILY MEDICINE CLINIC | Facility: CLINIC | Age: 45
End: 2025-02-04

## 2025-02-04 NOTE — TELEPHONE ENCOUNTER
Patient wants to get Hemorid removal requesting a referral to see a general surgeon. Does not want to see  the male. Prefers to see someone else

## 2025-02-05 ENCOUNTER — TELEPHONE (OUTPATIENT)
Dept: ADMINISTRATIVE | Age: 45
End: 2025-02-05

## 2025-02-05 DIAGNOSIS — K64.9 HEMORRHOIDS, UNSPECIFIED HEMORRHOID TYPE: Primary | ICD-10-CM

## 2025-02-06 NOTE — TELEPHONE ENCOUNTER
Please contact surgery department and see if Dr. Sayda Gabriel does hemorrhoid surgery.  If so I will enter referral.  If not, see if surgery staff has recommendation for female hemorrhoid surgeon.

## 2025-02-06 NOTE — TELEPHONE ENCOUNTER
Hello,    We received the following request from the phone room.   PT requesting to see a Female General Surgeon for Hemorrhoid care.  PT requested referral not to be place to Dr. Gonzalez.   Please update request to a General Surgeon, review DX and if agreeable please sign off.   Thank you  Nila

## 2025-02-06 NOTE — TELEPHONE ENCOUNTER
Patient insurance [facility] [employer] have already been verified in onesource.      Member is Eligible    SEARCH CRITERIA  NPI  [id number]  Subscriber ID  [id number]  Patient Last Name  miles  Patient First Name  tatum  Patient Date of Birth  12/02/1980  Relationship to Subscriber  Self  Eligibility Coverage Type  Health Benefit Plan Coverage  Date of Service  02/06/2025    SUBSCRIBER  Name  TATUM VANEGAS  Member ID Number  [id number]  Group Number  [id number]  Group Name  [id number]  Plan Network ID Number  [id number]  COM  Plan Network ID Number Description  HMO CODE, NETWORK ID, [facility]  Address  [address] [place] [id number]  Date of Birth  12/02/1980  Sex  Female  Relationship  Self  Plan Begin Date  05/01/2024  Plan End Date  04/30/2025  Eligibility Begin Date  11/01/2020    PRIMARY CARE PROVIDER  Service Type  Medical Care  Primary Care Provider Name  MAURISIO VALERIO  Address  [address] [place] [address] [address]    [place] [address]  Telephone  [phone]

## 2025-02-07 NOTE — TELEPHONE ENCOUNTER
Dr. Gabriel can see her Feb. 14th at 10:00am.  Sent a [employer] message. HEYDI SOLIZ RN from [facility].

## 2025-02-14 ENCOUNTER — OFFICE VISIT (OUTPATIENT)
Dept: SURGERY | Facility: CLINIC | Age: 45
End: 2025-02-14
Payer: COMMERCIAL

## 2025-02-14 VITALS — WEIGHT: 162 LBS | BODY MASS INDEX: 27 KG/M2 | DIASTOLIC BLOOD PRESSURE: 59 MMHG | SYSTOLIC BLOOD PRESSURE: 103 MMHG

## 2025-02-14 DIAGNOSIS — K64.0 GRADE I HEMORRHOIDS: Primary | ICD-10-CM

## 2025-02-14 PROCEDURE — 99214 OFFICE O/P EST MOD 30 MIN: CPT | Performed by: STUDENT IN AN ORGANIZED HEALTH CARE EDUCATION/TRAINING PROGRAM

## 2025-02-14 NOTE — PROGRESS NOTES
HPI:    Patient ID: Sujatha Olivarez is a 44 year old female presenting with   Chief Complaint   Patient presents with    Hemorrhoids     Patient is here for external Hemorrhoids.  The patient reports onset over eight years with intermittent flare ups.  She's had two episodes of scant bleeding.        She reports hemorrhoids for the past 8 years after her pregnancy. They're rarely painful. Occasional bleeding. They're always \"out\", with occasional flare ups causing them to get larger in size. They are bothersome for hygiene issues. She has tried fiber but not taking them currently. No prior procedures for hemorrhoids.       Past Medical History:    Uses contraceptive implant for birth control     No past surgical history on file.  Family History   Problem Relation Age of Onset    Breast Cancer Other         mat grt aunt     Social History     Socioeconomic History    Marital status:      Spouse name: Not on file    Number of children: Not on file    Years of education: Not on file    Highest education level: Not on file   Occupational History    Not on file   Tobacco Use    Smoking status: Never    Smokeless tobacco: Never   Vaping Use    Vaping status: Never Used   Substance and Sexual Activity    Alcohol use: Yes     Comment: social    Drug use: Not Currently    Sexual activity: Yes   Other Topics Concern    Not on file   Social History Narrative    Not on file     Social Drivers of Health     Food Insecurity: Not on file   Transportation Needs: Not on file   Stress: Not on file   Housing Stability: Not on file       Review of Systems   All other systems reviewed and are negative.          No current outpatient medications on file.       Allergies:Allergies[1]   PHYSICAL EXAM:   /59 (BP Location: Right arm)   Wt 162 lb (73.5 kg)   LMP 02/03/2025 (Exact Date)   BMI 26.67 kg/m²   Physical Exam  Genitourinary:     Comments: Non thrombosed external hemorrhoids, no fissures  External exam performed only       Chaperone present in room          ASSESSMENT/PLAN:   Diagnoses and all orders for this visit:    Grade I hemorrhoids      Sujatha Olivarez presents with non thrombosed external hemorrhoids, appearing more consistent with residual skin tags. Discussed that given they are nonthrombosed, not painful, advise conservative treatment with fiber and BID sitz baths when \"flare ups\" occur. Hemorrhoidectomy would be reserved for failure of conservative management given that post-op recovery can be difficult due to pain. She expresses understanding and will follow up as needed after 1-2 months.            Sayda Gabriel MD  2/14/2025       [1] No Known Allergies

## 2025-05-16 ENCOUNTER — LAB ENCOUNTER (OUTPATIENT)
Dept: LAB | Age: 45
End: 2025-05-16
Attending: FAMILY MEDICINE
Payer: COMMERCIAL

## 2025-05-16 DIAGNOSIS — Z00.00 ROUTINE PHYSICAL EXAMINATION: ICD-10-CM

## 2025-05-16 LAB
ANION GAP SERPL CALC-SCNC: 7 MMOL/L (ref 0–18)
BUN BLD-MCNC: 16 MG/DL (ref 9–23)
BUN/CREAT SERPL: 16 (ref 10–20)
CALCIUM BLD-MCNC: 9.4 MG/DL (ref 8.7–10.4)
CHLORIDE SERPL-SCNC: 105 MMOL/L (ref 98–112)
CHOLEST SERPL-MCNC: 247 MG/DL (ref ?–200)
CO2 SERPL-SCNC: 26 MMOL/L (ref 21–32)
CREAT BLD-MCNC: 1 MG/DL (ref 0.55–1.02)
DEPRECATED RDW RBC AUTO: 40.4 FL (ref 35.1–46.3)
EGFRCR SERPLBLD CKD-EPI 2021: 72 ML/MIN/1.73M2 (ref 60–?)
ERYTHROCYTE [DISTWIDTH] IN BLOOD BY AUTOMATED COUNT: 12.3 % (ref 11–15)
FASTING PATIENT LIPID ANSWER: YES
FASTING STATUS PATIENT QL REPORTED: YES
GLUCOSE BLD-MCNC: 92 MG/DL (ref 70–99)
HCT VFR BLD AUTO: 39.7 % (ref 35–48)
HDLC SERPL-MCNC: 60 MG/DL (ref 40–59)
HGB BLD-MCNC: 13.7 G/DL (ref 12–16)
LDLC SERPL CALC-MCNC: 174 MG/DL (ref ?–100)
MCH RBC QN AUTO: 31.2 PG (ref 26–34)
MCHC RBC AUTO-ENTMCNC: 34.5 G/DL (ref 31–37)
MCV RBC AUTO: 90.4 FL (ref 80–100)
NONHDLC SERPL-MCNC: 187 MG/DL (ref ?–130)
OSMOLALITY SERPL CALC.SUM OF ELEC: 287 MOSM/KG (ref 275–295)
PLATELET # BLD AUTO: 312 10(3)UL (ref 150–450)
POTASSIUM SERPL-SCNC: 3.9 MMOL/L (ref 3.5–5.1)
RBC # BLD AUTO: 4.39 X10(6)UL (ref 3.8–5.3)
SODIUM SERPL-SCNC: 138 MMOL/L (ref 136–145)
TRIGL SERPL-MCNC: 77 MG/DL (ref 30–149)
VLDLC SERPL CALC-MCNC: 15 MG/DL (ref 0–30)
WBC # BLD AUTO: 6.6 X10(3) UL (ref 4–11)

## 2025-05-16 PROCEDURE — 80048 BASIC METABOLIC PNL TOTAL CA: CPT

## 2025-05-16 PROCEDURE — 80061 LIPID PANEL: CPT

## 2025-05-16 PROCEDURE — 85027 COMPLETE CBC AUTOMATED: CPT | Performed by: FAMILY MEDICINE

## 2025-05-16 PROCEDURE — 36415 COLL VENOUS BLD VENIPUNCTURE: CPT | Performed by: FAMILY MEDICINE

## (undated) NOTE — LETTER
AUTHORIZATION FOR SURGICAL OPERATION OR OTHER PROCEDURE    1. I hereby authorize Dr. America Louis, and [place] [facility] staff assigned to my case to perform the following operation and/or procedure at the [facility], [facility]:    _______________________________________________________________________________________________      _______________________________________________________________________________________________    2. My physician has explained the nature and purpose of the operation or other procedure, possible alternative methods of treatment, the risks involved, and the possibility of complication to me. I acknowledge that no guarantee has been made as to the result that may be obtained. 3.  I recognize that, during the course of this operation, or other procedure, unforseen conditions may necessitate additional or different procedure than those listed above. I, therefore, further authorize and request that the above named physician, his/her physician assistants or designees perform such procedures as are, in his/her professional opinion, necessary and desirable. 4.  Any tissue or organs removed in the operation or other procedure may be disposed of by and at the discretion of the [place] [facility] and [facility] AT [facility]. 5.  I understand that in the event of a medical emergency, I will be transported by local paramedics to [facility] or other hospital emergency department. 6.  I certify that I have read and fully understand the above consent to operation and/or other procedure. 7.  I acknowledge that my physician has explained sedation/analgesia administration to me including the risks and benefits. I consent to the administration of sedation/analgesia as may be necessary or desirable in the judgement of my physician. Witness signature: ___________________________________________________ Date:  ______/______/_____                    Time:  ________ A. M.  P.M. Patient Name:  ______________________________________________________  (please print)      Patient signature:  ___________________________________________________             Relationship to Patient:           []  Parent    Responsible person                          []  Spouse  In case of minor or                    [] Other  _____________   Incompetent name:  __________________________________________________                               (please print)      _____________      Responsible person  In case of minor or  Incompetent signature:  _______________________________________________    Statement of Physician  My signature below affirms that prior to the time of the procedure, I have explained to the patient and/or his/her guardian, the risks and benefits involved in the proposed treatment and any reasonable alternative to the proposed treatment. I have also explained the risks and benefits involved in the refusal of the proposed treatment and have answered the patient's questions.                         Date:  ______/______/_______  Provider                      Signature:  __________________________________________________________       Time:  ___________ A.M    P.M.

## (undated) NOTE — LETTER
AUTHORIZATION FOR SURGICAL OPERATION OR OTHER PROCEDURE    1.  I hereby authorize Dr. Meena Lu , and [facility] staff assigned to my case to perform the following operation and/or procedure at the [facility]:    ___________________________ Patient Name:  ______________________________________________________  (please print)      Patient signature:  ___________________________________________________             Relationship to Patient:           []  Parent    Responsible person

## (undated) NOTE — LETTER
AUTHORIZATION FOR SURGICAL OPERATION OR OTHER PROCEDURE    1. I hereby authorize Dr. Helio Barbosa, and [address] [place] [address] staff assigned to my case to perform the following operation and/or procedure at the [address] [place] [address]:    _______________________________________________________________________________________________      _______________________________________________________________________________________________    2. My physician has explained the nature and purpose of the operation or other procedure, possible alternative methods of treatment, the risks involved, and the possibility of complication to me. I acknowledge that no guarantee has been made as to the result that may be obtained. 3.  I recognize that, during the course of this operation, or other procedure, unforseen conditions may necessitate additional or different procedure than those listed above. I, therefore, further authorize and request that the above named physician, his/her physician assistants or designees perform such procedures as are, in his/her professional opinion, necessary and desirable. 4.  Any tissue or organs removed in the operation or other procedure may be disposed of by and at the discretion of the [address] [place] [address] and [facility]. 5.  I understand that in the event of a medical emergency, I will be transported by local paramedics to [facility] or other hospital emergency department. 6.  I certify that I have read and fully understand the above consent to operation and/or other procedure. 7.  I acknowledge that my physician has explained sedation/analgesia administration to me including the risks and benefits. I consent to the administration of sedation/analgesia as may be necessary or desirable in the judgement of my physician. Witness signature: ___________________________________________________ Date:  ______/______/_____                    Time:  ________ A. M.  P.M. Patient Name:  ______________________________________________________  (please print)      Patient signature:  ___________________________________________________             Relationship to Patient:           []  Parent    Responsible person                          []  Spouse  In case of minor or                    [] Other  _____________   Incompetent name:  __________________________________________________                               (please print)      _____________      Responsible person  In case of minor or  Incompetent signature:  _______________________________________________    Statement of Physician  My signature below affirms that prior to the time of the procedure, I have explained to the patient and/or his/her guardian, the risks and benefits involved in the proposed treatment and any reasonable alternative to the proposed treatment. I have also explained the risks and benefits involved in the refusal of the proposed treatment and have answered the patient's questions.                         Date:  ______/______/_______  Provider                      Signature:  __________________________________________________________       Time:  ___________ A.M    P.M.